# Patient Record
Sex: FEMALE | Race: BLACK OR AFRICAN AMERICAN | NOT HISPANIC OR LATINO | URBAN - METROPOLITAN AREA
[De-identification: names, ages, dates, MRNs, and addresses within clinical notes are randomized per-mention and may not be internally consistent; named-entity substitution may affect disease eponyms.]

---

## 2024-11-22 ENCOUNTER — OFFICE VISIT (OUTPATIENT)
Dept: URBAN - METROPOLITAN AREA CLINIC 105 | Facility: CLINIC | Age: 59
End: 2024-11-22
Payer: COMMERCIAL

## 2024-11-22 ENCOUNTER — DASHBOARD ENCOUNTERS (OUTPATIENT)
Age: 59
End: 2024-11-22

## 2024-11-22 ENCOUNTER — TELEPHONE ENCOUNTER (OUTPATIENT)
Dept: URBAN - METROPOLITAN AREA CLINIC 6 | Facility: CLINIC | Age: 59
End: 2024-11-22

## 2024-11-22 VITALS
WEIGHT: 169 LBS | DIASTOLIC BLOOD PRESSURE: 77 MMHG | HEIGHT: 66 IN | TEMPERATURE: 96.8 F | BODY MASS INDEX: 27.16 KG/M2 | HEART RATE: 87 BPM | SYSTOLIC BLOOD PRESSURE: 148 MMHG

## 2024-11-22 DIAGNOSIS — K59.09 CHRONIC CONSTIPATION: ICD-10-CM

## 2024-11-22 DIAGNOSIS — Q45.3 PANCREAS DIVISUM: ICD-10-CM

## 2024-11-22 DIAGNOSIS — Z12.11 SCREENING FOR COLON CANCER: ICD-10-CM

## 2024-11-22 DIAGNOSIS — R10.84 GENERALIZED ABDOMINAL PAIN: ICD-10-CM

## 2024-11-22 DIAGNOSIS — R11.2 NAUSEA AND VOMITING IN ADULT: ICD-10-CM

## 2024-11-22 DIAGNOSIS — Z90.49 HISTORY OF RESECTION OF SMALL BOWEL: ICD-10-CM

## 2024-11-22 DIAGNOSIS — K85.90 RECURRENT ACUTE PANCREATITIS: ICD-10-CM

## 2024-11-22 DIAGNOSIS — Z87.19 HISTORY OF SMALL BOWEL OBSTRUCTION: ICD-10-CM

## 2024-11-22 PROBLEM — 347861000119105: Status: ACTIVE | Noted: 2024-11-22

## 2024-11-22 PROBLEM — 54554009: Status: ACTIVE | Noted: 2024-11-22

## 2024-11-22 PROCEDURE — 99204 OFFICE O/P NEW MOD 45 MIN: CPT | Performed by: INTERNAL MEDICINE

## 2024-11-22 RX ORDER — LEVOTHYROXINE SODIUM 100 UG/1
TABLET ORAL
Qty: 90 TABLET | Status: ACTIVE | COMMUNITY

## 2024-11-22 RX ORDER — ROSUVASTATIN CALCIUM 10 MG/1
TABLET, FILM COATED ORAL
Qty: 90 TABLET | Status: ACTIVE | COMMUNITY

## 2024-11-22 RX ORDER — INSULIN PMP CART,AUT,G6/7,CNTR
EACH SUBCUTANEOUS
Qty: 10 EACH | Status: ACTIVE | COMMUNITY

## 2024-11-22 RX ORDER — LINACLOTIDE 145 UG/1
1 CAPSULE AT LEAST 30 MINUTES BEFORE THE FIRST MEAL OF THE DAY ON AN EMPTY STOMACH CAPSULE, GELATIN COATED ORAL ONCE A DAY
Qty: 90 | Refills: 0 | OUTPATIENT
Start: 2024-11-22 | End: 2025-02-20

## 2024-11-22 RX ORDER — INSULIN LISPRO 200 [IU]/ML
INJECT 50 UNITS INTO THE SKIN 3 TIMES DAILY BEFORE MEALS INJECTION, SOLUTION SUBCUTANEOUS
Qty: 72 MILLILITER | Refills: 0 | Status: ACTIVE | COMMUNITY

## 2024-11-22 RX ORDER — KETOCONAZOLE CREAM, 2% 20 MG/G
CREAM TOPICAL
Qty: 15 GRAM | Status: ACTIVE | COMMUNITY

## 2024-11-22 RX ORDER — OXYCODONE HYDROCHLORIDE 5 MG/1
TAKE 1 TABLET BY MOUTH EVERY 6 HOURS AS NEEDED FOR PAIN TABLET ORAL
Qty: 12 EACH | Refills: 0 | Status: ACTIVE | COMMUNITY

## 2024-11-22 RX ORDER — ACYCLOVIR 800 MG/1
TAKE 1 TABLET BY MOUTH IN THE MORNING AND BEFORE BEDTIME TABLET ORAL
Qty: 180 EACH | Refills: 0 | Status: ACTIVE | COMMUNITY

## 2024-11-22 RX ORDER — ONDANSETRON 4 MG/1
TABLET, ORALLY DISINTEGRATING ORAL
Qty: 12 EACH | Status: ACTIVE | COMMUNITY

## 2024-11-22 RX ORDER — BLOOD-GLUCOSE SENSOR
1 EACH BY MISCELLANEOUS ROUTE EVERY 10 DAYS EACH MISCELLANEOUS
Qty: 9 EACH | Refills: 1 | Status: ACTIVE | COMMUNITY

## 2024-11-22 RX ORDER — ERGOCALCIFEROL CAPSULES, 1.25 MG/1
CAPSULE ORAL
Qty: 4 CAPSULE | Status: ACTIVE | COMMUNITY

## 2024-11-22 RX ORDER — FENOFIBRATE 48 MG/1
TAKE 1 TABLET BY MOUTH EVERY DAY IN THE MORNING TABLET ORAL
Qty: 90 EACH | Refills: 0 | Status: ACTIVE | COMMUNITY

## 2024-11-22 NOTE — PHYSICAL EXAM GASTROINTESTINAL
Abdomen multiple surgical scars and abnormal countour of abdominal wall, soft, generalized tenderness worst in LLQ, pt notes pain is worse with rebound than with direct tenderness, nondistended, no guarding or rigidity, no masses palpable, normal bowel sounds Liver and Spleen no hepatosplenomegaly Rectal deferred

## 2024-11-22 NOTE — HPI-ZZZTODAY'S VISIT
59-year-old female with PMH of acute recurrent pancreatitis, ileus, type 2 diabetes, HLD, and hypothyroidism, presenting as ER follow-up for pancreatitis. She was seen at ED 10/17/2024 with severe abdominal pain radiating to the back x 1 week, as well as nausea and intermittent vomiting.  She did not have improvement with dietary modifications.  Lipase 120.  CT A/P showed increased fat stranding surrounding pancreatic head, thick-walled bladder, hepatic steatosis, stable slight nodular contour of liver, nonspecific extrahepatic biliary ductal dilation, stable 0.6 cm left adrenal nodule, patulous appearance of distal small bowel and proximal large bowel which could be postoperative, enlarged upper abdominal lymph nodes which could be reactive.  She was given morphine, Zofran, fluids, and oxycodone. She previously saw different GI in 5/2023 for follow-up after hospitalization for pancreatitis.  It was noted that she has known history of pancreatic divisum.  CT noted mildly prominent pancreatic duct.  She had positive CJ and ASMA.  At that time she was ordered MRI/MRCP and colonoscopy, but these were not done. She has history of cholecystectomy in 2010 and ostomy with reversal in 2012.  Today, pt states in 2012 she had SBO after hysterectomy. This was in [place]. She had part of small intestine removed with ileostomy, which was then reversed 1 year later. States from that point on, she has been in pain. States she has 3-4/10 pain every day, which is tolerable. There are times when pain becomes severe and she cannot do anything. States it will come in waves of frequent episodes. States she is going to ED every other month. This pain is in lower left abdomen most of the time, but will sometimes radiate across the whole abdomen. This is different from pain she experiences with pancreatitis, which is epigastric.  States she has frequent nausea. States she sometimes has vomiting with this. Has not identified that foods cause this. Denies significant acid reflux or heartburn. She used to have 5 BM/day, soft to watery, up until this year. This year she has had formed stools, but now with constipation. She is only having 1 BM/week. She is currently taking DulcoLax; states she has tried MiraLax but didn't feel like it helped her.  She has tried taking digestive enzymes and probiotics.  States that she was told her pancreatitis is because of diabetes. She drinks alcohol socially - 1 drink every 2 months. States she was told it wasn't gastroparesis via GES at least 10 years ago.  She has had colonoscopy and EGD in the past, but unsure when - thinks at least 10 years ago.  Labs 10/17/2024: Glucose 146, sodium 132, alk phos 112, BUN/CR ratio 21, CMP otherwise normal.  Lipase 120.  Platelets 507, CBC otherwise normal. 6/25/2024: Glucose 162, alk phos 130, CMP otherwise normal.  Platelets 482, CBC otherwise normal.  Triglycerides 411, HDL 31, LDL 71.  A1c 12.3.  TSH normal. 4/21/2023: Glucose 178, sodium 135, AST 70, ALT 85, alk phos 155, CMP otherwise normal.  Platelets 442, CBC otherwise normal.  Lipase normal.  Triglycerides 854.  AMA, ASMA elevated.  CJ, hepatitis panel negative.  Total IgG normal.

## 2024-11-25 ENCOUNTER — TELEPHONE ENCOUNTER (OUTPATIENT)
Dept: URBAN - METROPOLITAN AREA CLINIC 105 | Facility: CLINIC | Age: 59
End: 2024-11-25

## 2024-12-20 ENCOUNTER — OFFICE VISIT (OUTPATIENT)
Dept: URBAN - METROPOLITAN AREA MEDICAL CENTER 33 | Facility: MEDICAL CENTER | Age: 59
End: 2024-12-20

## 2025-01-17 ENCOUNTER — OFFICE VISIT (OUTPATIENT)
Dept: URBAN - METROPOLITAN AREA CLINIC 105 | Facility: CLINIC | Age: 60
End: 2025-01-17